# Patient Record
Sex: FEMALE | Race: WHITE | NOT HISPANIC OR LATINO | Employment: STUDENT | URBAN - METROPOLITAN AREA
[De-identification: names, ages, dates, MRNs, and addresses within clinical notes are randomized per-mention and may not be internally consistent; named-entity substitution may affect disease eponyms.]

---

## 2024-04-16 ENCOUNTER — OFFICE VISIT (OUTPATIENT)
Dept: URGENT CARE | Facility: CLINIC | Age: 19
End: 2024-04-16
Payer: COMMERCIAL

## 2024-04-16 VITALS
RESPIRATION RATE: 16 BRPM | WEIGHT: 135 LBS | HEART RATE: 74 BPM | OXYGEN SATURATION: 99 % | BODY MASS INDEX: 21.19 KG/M2 | TEMPERATURE: 98 F | SYSTOLIC BLOOD PRESSURE: 130 MMHG | HEIGHT: 67 IN | DIASTOLIC BLOOD PRESSURE: 80 MMHG

## 2024-04-16 DIAGNOSIS — H10.9 CONJUNCTIVITIS OF RIGHT EYE, UNSPECIFIED CONJUNCTIVITIS TYPE: Primary | ICD-10-CM

## 2024-04-16 DIAGNOSIS — H57.89 EYE REDNESS: ICD-10-CM

## 2024-04-16 DIAGNOSIS — H57.89 EYE DRAINAGE: ICD-10-CM

## 2024-04-16 PROBLEM — G43.909 MIGRAINES: Status: ACTIVE | Noted: 2023-04-18

## 2024-04-16 PROCEDURE — 99203 OFFICE O/P NEW LOW 30 MIN: CPT | Mod: S$GLB,,, | Performed by: NURSE PRACTITIONER

## 2024-04-16 RX ORDER — OFLOXACIN 3 MG/ML
1 SOLUTION/ DROPS OPHTHALMIC EVERY 4 HOURS
Qty: 10 ML | Refills: 0 | Status: SHIPPED | OUTPATIENT
Start: 2024-04-16

## 2024-04-16 RX ORDER — DROSPIRENONE 4 MG/1
1 TABLET, FILM COATED ORAL
COMMUNITY
Start: 2024-04-13

## 2024-04-16 NOTE — LETTER
April 16, 2024      [facility] [address] [place] [address]  Phone: [phone]  Fax: [phone]       Patient: Johana Moscoso   YOB: 2005  Date of Visit: 04/16/2024    To Whom It May Concern:    Mireya Moscoso  was at [facility] on 04/16/2024. The patient may return to work/school on 04/18/24 with no restrictions. If you have any questions or concerns, or if I can be of further assistance, please do not hesitate to contact me.    Sincerely,      Beata Greene, NP

## 2024-04-16 NOTE — PROGRESS NOTES
"Subjective:      Patient ID: Johana Moscoso is a 19 y.o. female.    Vitals:  height is 5' 7" (1.702 m) and weight is 61.2 kg (135 lb). Her temperature is 97.6 °F (36.4 °C). Her blood pressure is 130/80 and her pulse is 74. Her respiration is 16 and oxygen saturation is 99%.     Chief Complaint: Eye Problem (Right eye)    Patient is a 19-year-old female who presents for evaluation of redness and irritation of the right eye.  This morning she has red crusty drainage has watery in light sensitive.  Denies any injury or trauma.  She does not wear contact lenses.  She reports no vision loss or visual changes.  Does report exposure to pinkeye.  No other concerns are voiced.    Eye Problem   The right eye is affected. This is a new problem. The current episode started yesterday. The problem occurs constantly. The problem has been gradually worsening. The injury mechanism was a foreign body (eyelash). The pain is at a severity of 7/10. There is Known exposure to pink eye. She Does not wear contacts. Associated symptoms include an eye discharge, eye redness, a foreign body sensation and photophobia. Pertinent negatives include no blurred vision, double vision, fever, itching, nausea, recent URI or vomiting. She has tried water for the symptoms. The treatment provided no relief.       Constitution: Negative for fever.   Eyes:  Positive for eye discharge, eye redness and photophobia. Negative for eye trauma, foreign body in eye, eye itching, eye pain, vision loss, double vision, blurred vision and eyelid swelling.   Gastrointestinal:  Negative for nausea and vomiting.   Neurological:  Negative for dizziness and headaches.      Objective:     Physical Exam   Constitutional: She is oriented to person, place, and time. She appears well-developed.   HENT:   Head: Normocephalic and atraumatic.   Ears:   Right Ear: External ear normal.   Left Ear: External ear normal.   Nose: Nose normal.   Mouth/Throat: Oropharynx is clear and " moist.   Eyes: Conjunctivae, EOM and lids are normal. Pupils are equal, round, and reactive to light. Lids are everted and swept, no foreign bodies found. Right eye exhibits discharge. Left eye exhibits discharge. vision grossly intact gaze aligned appropriately      Comments: Bilateral conjunctiva is mildly erythematous.  No foreign bodies.  Watery drainage with purulent crusting discharge noted in eyelashes of right eye.   Neck: Trachea normal and phonation normal. Neck supple.   Musculoskeletal: Normal range of motion.         General: Normal range of motion.   Neurological: She is alert and oriented to person, place, and time.   Skin: Skin is warm, dry and intact.   Psychiatric: Her speech is normal and behavior is normal. Judgment and thought content normal.   Nursing note and vitals reviewed.      Assessment:     1. Conjunctivitis of right eye, unspecified conjunctivitis type    2. Eye redness    3. Eye drainage        Plan:       Conjunctivitis of right eye, unspecified conjunctivitis type    Eye redness    Eye drainage    Other orders  -     ofloxacin (OCUFLOX) 0.3 % ophthalmic solution; Place 1 drop into both eyes every 4 (four) hours.  Dispense: 10 mL; Refill: 0          Medical Decision Making:   Initial Assessment:   Nontoxic appearing 18 yo female c/o eye redness.  After complete evaluation, including thorough history and physical exam, the patient's symptoms and clinical exam are consistent with a conjunctivitis.  The patient is otherwise well-appearing without evidence of retained foreign body, corneal ulcer, globe rupture, or superimposed infection. Prescribed antibiotics and instructed the patient to follow up closely with ophthalmology and avoid wearing contacts.          Vision Screening    Right eye Left eye Both eyes   Without correction 20/20 20/20 20/20   With correction          Patient Instructions   PLEASE READ YOUR DISCHARGE INSTRUCTIONS ENTIRELY AS IT CONTAINS IMPORTANT INFORMATION.      Use the antibiotic drops 4 as directed for 7 days - do not use past 10 days.      Keep hands clean. Wash hands before and after touching near your eyes.      Can use saline drops throughout the day if eyes feel dry or irritated.      Please follow up with an eye doctor in 3 days.      Please go to the ER for severely worsening symptoms (vision changes, pain, fever, swelling around your eye).      Please arrange follow up with your primary medical clinic as soon as possible. You must understand that you've received an Urgent Care treatment only and that you may be released before all of your medical problems are known or treated. You, the patient, will arrange for follow up as instructed. If your symptoms worsen or fail to improve you should go to the Emergency Room.  WE CANNOT RULE OUT ALL POSSIBLE CAUSES OF YOUR SYMPTOMS IN THE URGENT CARE SETTING PLEASE GO TO THE ER IF YOU FEELS YOUR CONDITION IS WORSENING OR YOU WOULD LIKE EMERGENT EVALUATION.

## 2024-04-16 NOTE — PATIENT INSTRUCTIONS
PLEASE READ YOUR DISCHARGE INSTRUCTIONS ENTIRELY AS IT CONTAINS IMPORTANT INFORMATION.     Use the antibiotic drops 4 as directed for 7 days - do not use past 10 days.      Keep hands clean. Wash hands before and after touching near your eyes.      Can use saline drops throughout the day if eyes feel dry or irritated.      Please follow up with an eye doctor in 3 days.      Please go to the ER for severely worsening symptoms (vision changes, pain, fever, swelling around your eye).      Please arrange follow up with your primary medical clinic as soon as possible. You must understand that you've received an Urgent Care treatment only and that you may be released before all of your medical problems are known or treated. You, the patient, will arrange for follow up as instructed. If your symptoms worsen or fail to improve you should go to the Emergency Room.  WE CANNOT RULE OUT ALL POSSIBLE CAUSES OF YOUR SYMPTOMS IN THE URGENT CARE SETTING PLEASE GO TO THE ER IF YOU FEELS YOUR CONDITION IS WORSENING OR YOU WOULD LIKE EMERGENT EVALUATION.

## 2024-09-02 ENCOUNTER — OFFICE VISIT (OUTPATIENT)
Dept: URGENT CARE | Facility: CLINIC | Age: 19
End: 2024-09-02
Payer: COMMERCIAL

## 2024-09-02 VITALS
TEMPERATURE: 99 F | DIASTOLIC BLOOD PRESSURE: 75 MMHG | WEIGHT: 133.19 LBS | OXYGEN SATURATION: 98 % | SYSTOLIC BLOOD PRESSURE: 133 MMHG | HEIGHT: 67 IN | RESPIRATION RATE: 18 BRPM | HEART RATE: 91 BPM | BODY MASS INDEX: 20.9 KG/M2

## 2024-09-02 DIAGNOSIS — J32.9 SINUSITIS, UNSPECIFIED CHRONICITY, UNSPECIFIED LOCATION: Primary | ICD-10-CM

## 2024-09-02 PROCEDURE — 99213 OFFICE O/P EST LOW 20 MIN: CPT | Mod: S$GLB,,, | Performed by: FAMILY MEDICINE

## 2024-09-02 RX ORDER — AZITHROMYCIN 250 MG/1
TABLET, FILM COATED ORAL
Qty: 6 TABLET | Refills: 0 | Status: SHIPPED | OUTPATIENT
Start: 2024-09-02 | End: 2024-09-07

## 2024-09-02 RX ORDER — BROMPHENIRAMINE MALEATE, PSEUDOEPHEDRINE HYDROCHLORIDE, AND DEXTROMETHORPHAN HYDROBROMIDE 2; 30; 10 MG/5ML; MG/5ML; MG/5ML
SYRUP ORAL
Qty: 180 ML | Refills: 0 | Status: SHIPPED | OUTPATIENT
Start: 2024-09-02

## 2024-09-02 NOTE — PATIENT INSTRUCTIONS
Thank you for allowing our team to care of you today.   The diagnosis today is acute sinusitis.   Adding Zithromax antibiotic and Bromfed DM for cough/congestion prescriptions.  Other recommendations below.   Immediate medical provider/ER evaluation if symptoms continue or worsen. Secondary infections can occur.   Followup here as needed.       SYMPTOM MEDICATIONS IF NEEDED AND APPROPRIATE:    You may gargle with warm salt water/peroxide 4 times a day as needed for irritated throat.     Make sure you are getting rest.    You can use cough drops or lozenges to soothe your sore throat and for cough.     You can also take Vitamin C, D, Zinc, Elderberry or similar to help boost your immune system.    Honey is a natural cough suppressant that can be used.     Use Nasal Saline Spray to keep nasal passages moist.    A Humidifier can be used to keep moisture in the air.       PAIN/DISCOMFORT:  Tylenol and Ibuprofen can be alternated every 4 hours if needed for aches or fever if no allergy or restriction.

## 2024-09-02 NOTE — PROGRESS NOTES
"Subjective:      Patient ID: Johana Moscoso is a 19 y.o. female.    Vitals:  height is 5' 7" (1.702 m) and weight is 60.4 kg (133 lb 2.5 oz). Her tympanic temperature is 98.5 °F (36.9 °C). Her blood pressure is 133/75 and her pulse is 91. Her respiration is 18 and oxygen saturation is 98%.     Chief Complaint: Nasal Congestion (was sick for about a week and most symptoms went away but left with a deep wet cough and green nasal mucus - Entered by patient)    20 yo female with cough, sinus pressure, pnd, irritated throat and nasal congestion.  Symptoms started 2 weeks ago with a viral illness which cleared except for this. Dayquil and Nyquil with temporary relief. Allergy pills and Afrin with mild relief.     Other  This is a new problem. Episode onset: 2 weeks. The problem occurs constantly. The problem has been gradually improving. Associated symptoms include congestion and coughing. Pertinent negatives include no abdominal pain, chest pain, chills, fever, headaches, myalgias, nausea, rash, vomiting or weakness. Associated symptoms comments: Sinus Pressure   .       Constitution: Negative for chills and fever.   HENT:  Positive for congestion, postnasal drip and sinus pressure. Negative for ear pain.    Cardiovascular: Negative.  Negative for chest pain.   Respiratory:  Positive for cough. Negative for shortness of breath.    Gastrointestinal:  Negative for abdominal pain, nausea, vomiting and diarrhea.   Genitourinary: Negative.    Musculoskeletal: Negative.  Negative for muscle ache.   Skin:  Negative for rash.   Neurological:  Negative for headaches.      Objective:     Physical Exam   Constitutional: She is oriented to person, place, and time. No distress.   HENT:   Head: Normocephalic.   Ears:   Right Ear: Tympanic membrane, external ear and ear canal normal.   Left Ear: Tympanic membrane, external ear and ear canal normal.   Nose: Sinus tenderness and congestion present. No epistaxis. Right sinus exhibits " maxillary sinus tenderness. Left sinus exhibits maxillary sinus tenderness.   Mouth/Throat: Mucous membranes are moist. No oropharyngeal exudate or posterior oropharyngeal erythema.   Eyes: Conjunctivae are normal. Pupils are equal, round, and reactive to light. Extraocular movement intact   Neck: Neck supple.   Cardiovascular: Normal rate, regular rhythm, normal heart sounds and normal pulses.   Pulmonary/Chest: Effort normal and breath sounds normal.   Abdominal: Normal appearance. She exhibits no distension. Soft. There is no abdominal tenderness. There is no left CVA tenderness and no right CVA tenderness.   Musculoskeletal: Normal range of motion.         General: Normal range of motion.   Neurological: no focal deficit. She is alert and oriented to person, place, and time.   Skin: Skin is warm and no rash.   Psychiatric: Her behavior is normal. Mood, judgment and thought content normal.   Nursing note and vitals reviewed.      Assessment:     1. Sinusitis, unspecified chronicity, unspecified location        Plan:       Sinusitis, unspecified chronicity, unspecified location    Other orders  -     azithromycin (Z-ALDO) 250 MG tablet; Take 2 tablets by mouth on day 1; Take 1 tablet by mouth on days 2-5  Dispense: 6 tablet; Refill: 0  -     brompheniramine-pseudoeph-DM (BROMFED DM) 2-30-10 mg/5 mL Syrp; 5 ml to 10 ml po q 6 hours prn cough/congestion.  Dispense: 180 mL; Refill: 0      Review of patient's allergies indicates:  No Known Allergies        SUMMARY: See hpi. Dx'd Sinusitis. Supportive measures. See below. Adding antibiotic. Provider recheck if continues or new symptoms. Handout on Sinusitis given as well.       Patient Instructions   Thank you for allowing our team to care of you today.   The diagnosis today is acute sinusitis.   Adding Zithromax antibiotic and Bromfed DM for cough/congestion prescriptions.  Other recommendations below.   Immediate medical provider/ER evaluation if symptoms continue or  worsen. Secondary infections can occur.   Followup here as needed.       SYMPTOM MEDICATIONS IF NEEDED AND APPROPRIATE:    You may gargle with warm salt water/peroxide 4 times a day as needed for irritated throat.     Make sure you are getting rest.    You can use cough drops or lozenges to soothe your sore throat and for cough.     You can also take Vitamin C, D, Zinc, Elderberry or similar to help boost your immune system.    Honey is a natural cough suppressant that can be used.     Use Nasal Saline Spray to keep nasal passages moist.    A Humidifier can be used to keep moisture in the air.       PAIN/DISCOMFORT:  Tylenol and Ibuprofen can be alternated every 4 hours if needed for aches or fever if no allergy or restriction.

## 2025-04-14 ENCOUNTER — OFFICE VISIT (OUTPATIENT)
Dept: URGENT CARE | Facility: CLINIC | Age: 20
End: 2025-04-14
Payer: COMMERCIAL

## 2025-04-14 VITALS
OXYGEN SATURATION: 97 % | SYSTOLIC BLOOD PRESSURE: 138 MMHG | TEMPERATURE: 98 F | HEART RATE: 78 BPM | WEIGHT: 147.06 LBS | BODY MASS INDEX: 23.08 KG/M2 | RESPIRATION RATE: 17 BRPM | HEIGHT: 67 IN | DIASTOLIC BLOOD PRESSURE: 89 MMHG

## 2025-04-14 DIAGNOSIS — J32.9 SINUSITIS, UNSPECIFIED CHRONICITY, UNSPECIFIED LOCATION: Primary | ICD-10-CM

## 2025-04-14 PROCEDURE — 99213 OFFICE O/P EST LOW 20 MIN: CPT | Mod: S$GLB,,, | Performed by: FAMILY MEDICINE

## 2025-04-14 RX ORDER — AZITHROMYCIN 250 MG/1
TABLET, FILM COATED ORAL
Qty: 6 TABLET | Refills: 0 | Status: SHIPPED | OUTPATIENT
Start: 2025-04-14

## 2025-04-14 NOTE — LETTER
April 14, 2025    Johana Moscoso  [address] [place] [id number]             [facility]  Urgent Care  [address] [place] [address] [place] [address]  Phone: [phone]  Fax: [phone]   April 14, 2025     Patient: Johana Moscoso   YOB: 2005   Date of Visit: 4/14/2025       To Whom it May Concern:    Johana Moscoso was seen in my clinic on 4/14/2025. She may return to work on 04/16/2025 .    Please excuse her from any work missed.    If you have any questions or concerns, please don't hesitate to call.    Sincerely,         Lanie Quick MD

## 2025-04-14 NOTE — PATIENT INSTRUCTIONS
Thank you for allowing our team to take care of you today.  You are being treated for sinusitis as a complication of your upper respiratory allergies.  Adding zithromax antibiotic  Symptom medication as needed.  If any symptoms continue or worsen, get an immediate medical provider/ER evaluation.      SYMPTOM MEDICATIONS IF NEEDED AND APPROPRIATE:    You may gargle with warm salt water/peroxide 4 times a day as needed for irritated throat.     Make sure you are getting rest.    You can use cough drops or lozenges to soothe your sore throat and for cough     You can also take Elderberry, Vitamin C, D, Zinc to help boost your immune system.    Over the counter appropriate cough/congestion medication if needed.     Honey is a natural cough suppressant that can be used.    Make sure to stay well hydrated.    Use Nasal Saline Spray to keep nasal passages moist.    A Humidifier can be used to keep moisture in the air.     Tylenol and Ibuprofen can be alternated every 4 hours if needed for aches or fever if no allergy or restriction.